# Patient Record
(demographics unavailable — no encounter records)

---

## 2025-05-22 NOTE — DATA REVIEWED
[MRI] : MRI [Lumbar Spine] : lumbar spine [Report was reviewed and noted in the chart] : The report was reviewed and noted in the chart [I independently reviewed and interpreted images and report] : I independently reviewed and interpreted images and report [FreeTextEntry1] :  L3-S1 mild DDD

## 2025-05-22 NOTE — ASSESSMENT
[FreeTextEntry1] : LEIA MUNGUIA is a 49 year old male presenting with neck and lower back pain that radiates into BUE and BLE with n/t that started in the early 2000s after a snowboarding accident. Has an MRI of LS from 2023: L3-S1 mild DDD. Doesn't correlate with patient's presenting symptoms. Will get a C MRI to further eval current LE symptoms were present when the MRI was done in 2023 that we looked at;  there is no structural pathology that surgery can treat.  -C MRI ordered  we'll find out if there is a cervical spine cord condition that has been responsible even for his lower extremity symptoms -f/u to discuss results

## 2025-05-22 NOTE — HISTORY OF PRESENT ILLNESS
[Lower back] : lower back [Neck] : neck [de-identified] : 05/22/2025: 49 year-old male patient presents for neck and LBP. In 1996 had a snowboarding accident in the [place] where he had sx.In early 2000s patient started to notice n/t into the groin and thighs. Was advised to get a procedure done. Was told in 2023 that he may have ALS. Was disproven and he started to develop n/t into the arms and hands. Neuro had an MRI done for the LS, never had a CS and pelvis MRI.

## 2025-06-26 NOTE — ASSESSMENT
[FreeTextEntry1] : LEIA MUNGUIA is a 49 year old male presenting with neck and lower back pain that radiates into BUE and BLE with n/t that started in the early 2000s after a snowboarding accident. Has an MRI of LS from 2023: L3-S1 mild DDD. Doesn't correlate with patient's presenting symptoms. 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 Discussed that UE symptoms would not be able to treat with surgery.  current LE symptoms were present when the MRI was done in 2023 that we looked at;  there is no structural pathology that surgery can treat.  Would refer to rheumatology to explain new bursitis, n/t in BUE and BLE; also to explain concerns of muscle wasting

## 2025-06-26 NOTE — HISTORY OF PRESENT ILLNESS
[Neck] : neck [Lower back] : lower back [de-identified] : 6/26/25: MRI f/u. Notes TTP b/l GT band. Continues to have n/t in b/l thighs.  05/22/2025: 49 year-old male patient presents for neck and LBP. In 1996 had a snowboarding accident in the [place] where he had sx.In early 2000s patient started to notice n/t into the groin and thighs. Was advised to get a procedure done. Was told in 2023 that he may have ALS. Was disproven and he started to develop n/t into the arms and hands. Neuro had an MRI done for the LS, never had a CS and pelvis MRI.

## 2025-06-26 NOTE — DATA REVIEWED
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